# Patient Record
Sex: FEMALE
[De-identification: names, ages, dates, MRNs, and addresses within clinical notes are randomized per-mention and may not be internally consistent; named-entity substitution may affect disease eponyms.]

---

## 2020-12-07 PROBLEM — Z00.00 ENCOUNTER FOR PREVENTIVE HEALTH EXAMINATION: Status: ACTIVE | Noted: 2020-12-07

## 2020-12-15 ENCOUNTER — NON-APPOINTMENT (OUTPATIENT)
Age: 45
End: 2020-12-15

## 2020-12-15 ENCOUNTER — APPOINTMENT (OUTPATIENT)
Dept: ORTHOPEDIC SURGERY | Facility: CLINIC | Age: 45
End: 2020-12-15
Payer: MEDICAID

## 2020-12-15 VITALS
DIASTOLIC BLOOD PRESSURE: 89 MMHG | WEIGHT: 184 LBS | HEART RATE: 70 BPM | HEIGHT: 67 IN | SYSTOLIC BLOOD PRESSURE: 134 MMHG | BODY MASS INDEX: 28.88 KG/M2

## 2020-12-15 DIAGNOSIS — M75.102 UNSPECIFIED ROTATOR CUFF TEAR OR RUPTURE OF LEFT SHOULDER, NOT SPECIFIED AS TRAUMATIC: ICD-10-CM

## 2020-12-15 DIAGNOSIS — M75.22 BICIPITAL TENDINITIS, LEFT SHOULDER: ICD-10-CM

## 2020-12-15 DIAGNOSIS — Z78.9 OTHER SPECIFIED HEALTH STATUS: ICD-10-CM

## 2020-12-15 PROCEDURE — 99204 OFFICE O/P NEW MOD 45 MIN: CPT

## 2020-12-15 PROCEDURE — 73030 X-RAY EXAM OF SHOULDER: CPT | Mod: LT

## 2020-12-15 PROCEDURE — 99072 ADDL SUPL MATRL&STAF TM PHE: CPT

## 2020-12-15 RX ORDER — DICLOFENAC SODIUM 50 MG/1
50 TABLET, DELAYED RELEASE ORAL TWICE DAILY
Qty: 60 | Refills: 0 | Status: ACTIVE | COMMUNITY
Start: 2020-12-15 | End: 1900-01-01

## 2020-12-15 NOTE — PHYSICAL EXAM
[de-identified] : Physical Examination\par General: well nourished, in no acute distress, alert and oriented to person, place and time\par Psychiatric: normal mood and affect, no abnormal movements or speech patterns\par Eyes: vision intact - glasses\par Throat: no thyromegaly\par Lymph: no enlarged nodes, no lymphedema in extremity\par Respiratory: no wheezing, no shortness of breath with ambulation\par Cardiac: no cardiac leg swelling, 2+ peripheral pulses\par Neurology: normal gross sensation in extremities to light touch\par Abdomen: soft, non-tender, tympanic, no masses\par \par Musculoskeletal Examination\par Cervical spine	Full painless range of motion and negative Spurling's test\par Limited exam\par Shoulder			Right			Left\par Appearance\par      Skin/Swelling/Deformity	normal			normal\par      Scapular Winging		-			-\par Range of Motion\par      Forward Flexion		170 / 170		170 / 170\par      Abduction			170 / 170		170 / 170\par      External Rotation		45			45\par      Internal Rotation		T10			T10\par      SAbd Ext Rotation		90			90\par      SAbd Int Rotation		80			60\par      Painful Arc			-			+\par      Crepitus			-			-\par Palpation\par      Clavicle			-			-\par      AC Joint			-			-\par      Posterior Acromion		-			-\par      Levator Scapula		-			-\par      Lateral Bursa			-			+\par      Impingement Area		-			+\par      Biceps Tendon		-			+\par      Anterior Capsule		-			-\par Strength Examination\par      Supraspinatous 		5+ / 0			5+ / +\par      Infraspinatous			5+ / 0			5+ / 0\par      Subscapularis			5+ / 0			5+ / 0\par      Belly Press			5+ / 0			5+ / 0\par      Lift Off			-			-\par      Drop-Arm			-			-\par Special Examination\par      Biceps Blanchard's		-			+ both\par      Impingement Neer		-			+\par      Impingement Hawking		-			+\par \par      Apprehension			-			-\par           Suppression Appre		-			-\par      Anterior Subluxation		-			-\par      Posterior Subluxation		-			-\par      AC Cross-Body\par           Anterior			-			-\par           Posterior			-			-\par \par Sensation\par      Axillary			normal			normal\par      LatAntCubBrach 		normal			normal\par      Median 			normal			normal\par      Ulnar 			normal			normal\par      Radial 			normal			normal\par Motor\par      AIN 				normal			normal\par      Ulnar 			normal			normal\par      Radial 			normal			normal\par      PIN 				normal			normal\par Pulses\par      Radial			2+			2+ [de-identified] : 4 views of the affected left shoulder (AP, Glenoid, Y-View, Axillary)\par were ordered, obtained and evaluated by myself today and\par demonstrate:\par normal bony calcification without dislocation and no fracture\par 	Arch	1B\par 	AC Joint	no Arthrosis\par 	GH Joint	no Arthrosis\par 	Calcifications	none

## 2020-12-15 NOTE — HISTORY OF PRESENT ILLNESS
[de-identified] : CC  LEFT shoulder/neck\par \par HPI 45 year yo F right HD, accompanied with niece who is translating, presents with chronic gradual onset 3yrs of constant pain in the left neck radiating to the mid arm without injury. The pain is worse and rated a  6 out of 10, described as stiffness with radiation too mid arm. Lying down makes the pain better and reading makes the pain worse. The patient reports associated symptoms of numbess/tingling in BL hands. The patient denies pain at night affecting sleep, reports neck pain, and reports similar pain previously.\par  \par The patient has tried the following treatments:\par Activity modification	+\par Ice			-\par Nsaids    		-\par Physical Therapy  	-\par Cortisone Injection	-\par Arthroscopy/Surgery	-\par \par  \par Review of Systems is positive for the above musculoskeletal symptoms and is otherwise non-contributory for general, constitutional, psychiatric, neurologic, HEENT, cardiac, respiratory, gastrointestinal, reproductive, lymphatic, and dermatologic complaints.\par  \par Consult by Lennie

## 2020-12-15 NOTE — DISCUSSION/SUMMARY
[de-identified] : The shoulder biceps tendinitis, rotator cuff syndrome\par \par I discussed my findings on history, exam and radiology.\par \par I reviewed the anatomy and function of the shoulder rotator cuff muscles and tendons, biceps tendon and labrum. Given the current findings for the patient, I recommend proceeding with non-operative management of the shoulder consisting of the following:\par \par Patient education about the shoulder motions causing pain and possibly injury for activity modification\par \par Ice or warm compress\par \par Physical therapy prescription with shoulder rotator cuff strengthening, vikas-scapular stabilization, ROM stretching, mobilization, modalities, HEP\par \par The patient was prescribed Diclofenac PO non-steroidal anti-inflammatory medication. 50mg tablets twice daily to be taken for at least 1-2 weeks in a row and then PRN afterwards. Risks and benefits were discussed and include but not limited to renal damage and GI ulceration and bleeding.  They were advised to take with food to limit stomach upset as well as warned to stop the medication if worsening gastric pain or dizziness or other side effects. Also to immediately stop the medication and seek appropriate medical attention if any severe stomach ache, gastritis, black/red vomit, black/red stools or any other medical concern.\par \par Declined corticosteroid injection of the biceps tendon sheath\par \par The patient verifies their understanding the the visit, diagnosis and plan. They agree with the treatment plan and will contact the office with any questions or problems.\par \par FU after PT completion if unimproved